# Patient Record
Sex: MALE | Race: BLACK OR AFRICAN AMERICAN | NOT HISPANIC OR LATINO | Employment: OTHER | URBAN - METROPOLITAN AREA
[De-identification: names, ages, dates, MRNs, and addresses within clinical notes are randomized per-mention and may not be internally consistent; named-entity substitution may affect disease eponyms.]

---

## 2018-12-04 ENCOUNTER — TRANSCRIBE ORDERS (OUTPATIENT)
Dept: NEPHROLOGY | Facility: CLINIC | Age: 53
End: 2018-12-04

## 2018-12-04 DIAGNOSIS — N18.30 CKD (CHRONIC KIDNEY DISEASE), SYMPTOM MANAGEMENT ONLY, STAGE 3 (MODERATE) (HCC): Primary | ICD-10-CM

## 2018-12-04 RX ORDER — NIFEDIPINE 90 MG/1
90 TABLET, FILM COATED, EXTENDED RELEASE ORAL DAILY
Refills: 2 | COMMUNITY
Start: 2018-10-31

## 2018-12-04 RX ORDER — COLCHICINE 0.6 MG/1
0.6 TABLET ORAL 2 TIMES DAILY
COMMUNITY
End: 2018-12-14 | Stop reason: ALTCHOICE

## 2018-12-04 RX ORDER — ROSUVASTATIN CALCIUM 5 MG/1
10 TABLET, COATED ORAL DAILY
Refills: 3 | COMMUNITY
Start: 2018-09-21

## 2018-12-04 RX ORDER — METOPROLOL SUCCINATE 100 MG/1
100 TABLET, EXTENDED RELEASE ORAL DAILY
Refills: 3 | COMMUNITY
Start: 2018-09-22

## 2018-12-04 RX ORDER — LOSARTAN POTASSIUM 100 MG/1
100 TABLET ORAL DAILY
Refills: 3 | COMMUNITY
Start: 2018-11-09 | End: 2019-10-17 | Stop reason: ALTCHOICE

## 2018-12-04 RX ORDER — ALLOPURINOL 300 MG/1
TABLET ORAL
Refills: 4 | COMMUNITY
Start: 2018-11-25 | End: 2018-12-14 | Stop reason: DRUGHIGH

## 2018-12-06 LAB
ALBUMIN SNV-MCNC: 3.5 G/DL
BUN SERPL-MCNC: 27 MG/DL (ref 5–25)
CHOLEST SERPL-MCNC: 228 MG/DL (ref 50–200)
CREAT SERPL-MCNC: 1.9 MG/DL (ref 0.6–1.3)
EXT GLUCOSE BLD: 91
EXTERNAL ALK PHOS: 101
EXTERNAL ALT: 24
EXTERNAL AST: 21
EXTERNAL BICARBONATE: 27
EXTERNAL CALCIUM: 9.2
EXTERNAL CHLORIDE: 109
EXTERNAL EGFR: 39
EXTERNAL POTASSIUM: 4.1
EXTERNAL SODIUM: 144
EXTERNAL T.BILIRUBIN: 0.6
EXTERNAL TOTAL PROTEIN: 6.8
EXTERNAL URIC ACID: 5.4
HCT VFR BLD AUTO: 43.4 % (ref 36.5–49.3)
HDLC SERPL-MCNC: 60 MG/DL (ref 40–60)
HGB BLD-MCNC: 14.6 G/DL (ref 12–17)
LDLC SERPL CALC-MCNC: 141 MG/DL (ref 0–100)
PLATELET # BLD AUTO: 293 THOUSANDS/UL (ref 149–390)
TRIGL SERPL-MCNC: 148 MG/DL (ref ?–150)
TSH SERPL DL<=0.005 MIU/L-ACNC: 1.84 M[IU]/L
WBC # BLD AUTO: 17.1 THOUSAND/UL

## 2018-12-14 ENCOUNTER — CONSULT (OUTPATIENT)
Dept: NEPHROLOGY | Facility: CLINIC | Age: 53
End: 2018-12-14
Payer: COMMERCIAL

## 2018-12-14 VITALS — WEIGHT: 315 LBS | BODY MASS INDEX: 39.17 KG/M2 | TEMPERATURE: 97.6 F | HEIGHT: 75 IN

## 2018-12-14 DIAGNOSIS — M10.49 OTHER SECONDARY GOUT, MULTIPLE SITES, UNSPECIFIED CHRONICITY: ICD-10-CM

## 2018-12-14 DIAGNOSIS — I10 ESSENTIAL HYPERTENSION: ICD-10-CM

## 2018-12-14 DIAGNOSIS — N18.30 CKD (CHRONIC KIDNEY DISEASE), SYMPTOM MANAGEMENT ONLY, STAGE 3 (MODERATE) (HCC): Primary | ICD-10-CM

## 2018-12-14 DIAGNOSIS — E78.00 PURE HYPERCHOLESTEROLEMIA: ICD-10-CM

## 2018-12-14 DIAGNOSIS — N18.30 CKD (CHRONIC KIDNEY DISEASE) STAGE 3, GFR 30-59 ML/MIN (HCC): ICD-10-CM

## 2018-12-14 PROCEDURE — 99244 OFF/OP CNSLTJ NEW/EST MOD 40: CPT | Performed by: INTERNAL MEDICINE

## 2018-12-14 RX ORDER — ALLOPURINOL 100 MG/1
100 TABLET ORAL DAILY
Qty: 30 TABLET | Refills: 0 | Status: SHIPPED | OUTPATIENT
Start: 2018-12-14 | End: 2019-03-29 | Stop reason: ALTCHOICE

## 2018-12-14 NOTE — ASSESSMENT & PLAN NOTE
He is anxious to blood pressure initially was high per later on came down to 140/90 which is better  Advised to continue monitoring blood pressure at home  He is going to bring his machine next time    Importance of blood pressure control and kidney disease discussed with him

## 2018-12-14 NOTE — PATIENT INSTRUCTIONS
Chronic Kidney Disease   AMBULATORY CARE:   Chronic kidney disease (CKD)  is the gradual and permanent loss of kidney function  Normally, the kidneys remove fluid, chemicals, and waste from your blood  These wastes are turned into urine by your kidneys  CKD may worsen over time and lead to kidney failure  Common symptoms include the following:   · Changes in how often you need to urinate    · Swelling in your arms, legs, or feet    · Shortness of breath    · Fatigue or weakness    · Bad or bitter taste in your mouth    · Nausea, vomiting, or loss of appetite  Seek care immediately if:   · You are confused and very drowsy  · You have a seizure  · You have shortness of breath  Contact your healthcare provider if:   · You suddenly gain or lose more weight than your healthcare provider has told you is okay  · You have itchy skin or a rash  · You urinate more or less than you normally do  · You have blood in your urine  · You have nausea and repeated vomiting  · You have fatigue or muscle weakness  · You have hiccups that will not stop  · You have questions or concerns about your condition or care  Treatment for CKD:  Medicines may be given to decrease blood pressure and get rid of extra fluid  You may also receive medicine to manage health conditions that may occur with CKD  Dialysis is a treatment to remove chemicals and waste from your blood when your kidneys can no longer do this  Surgery may be needed to create an arteriovenous fistula (AVF) in your arm or insert a catheter into your abdomen so that you can receive dialysis  A kidney transplant may be done if your CKD becomes severe  Manage CKD:   · Maintain a healthy weight  Ask your healthcare provider how much you should weigh  Ask him to help you create a weight loss plan if you are overweight  · Exercise 30 to 60 minutes a day, 4 to 7 times a week, or as directed  Ask about the best exercise plan for you   Regular exercise can help you manage CKD, high blood pressure, and diabetes  · Follow your healthcare provider's advice about what to eat and drink  He may tell you to eat food low in sodium (salt), potassium, phosphorus, or protein  You may need to see a dietitian if you need help planning meals  Ask how much liquid to drink each day and which liquids are best for you  · Limit alcohol  Ask how much alcohol is safe for you to drink  A drink of alcohol is 12 ounces of beer, 5 ounces of wine, or 1½ ounces of liquor  · Do not smoke  Nicotine and other chemicals in cigarettes and cigars can cause lung and kidney damage  Ask your healthcare provider for information if you currently smoke and need help to quit  E-cigarettes or smokeless tobacco still contain nicotine  Talk to your healthcare provider before you use these products  · Ask your healthcare provider if you need vaccines  Infections such as pneumonia, influenza, and hepatitis can be more harmful or more likely to occur in a person who has CKD  Vaccines reduce your risk of infection with these viruses  Follow up with your healthcare provider as directed:  Write down your questions so you remember to ask them during your visits  © 2017 [employer] Information is for End User's use only and may not be sold, redistributed or otherwise used for commercial purposes  All illustrations and images included in CareNotes® are the copyrighted property of [employer]  or [employer]  The above information is an  only  It is not intended as medical advice for individual conditions or treatments  Talk to your doctor, nurse or pharmacist before following any medical regimen to see if it is safe and effective for you

## 2018-12-14 NOTE — ASSESSMENT & PLAN NOTE
He does have stage III CKD  I am not sure about his baseline as that may have gotten worse with pain killer which he took  I will try to get data from the past as he does get blood work every year  I discuss pathophysiology of kidney disease at length with his wife and him  Asymptomatic and progressive nature of kidney disease discussed with him  Control of risk factor also discussed with him  Advised to avoid any nephrotoxic medicine like ibuprofen or any procedure discussed with him  Hydration also advised    I will repeat blood work including urine test and kidney ultrasound    I will also reduce the dose of allopurinol at it can be nephrotoxic and advised to stop it in couple of weeks    Advised to stop taking nonsteroidal pain killer and use Tylenol only

## 2018-12-14 NOTE — PROGRESS NOTES
NEPHROLOGY OFFICE CONSULT  Jania Carballo 45 y o  male MRN: [id number]    Encounter: [id number] 12/14/2018    REASON FOR VISIT: Jania Carballo is a 45 y o male who was referred by Bertha Perez MD for evaluation of Consult    HPI:    Roopa Roger came in today for evaluation and management of CKD  57-year-old gentleman who is obese and problem hypertension for many years was found to have worsening renal function so was advised to see me    Patient claims he had seen me in the past though does not remember when  He does have problem hypertension many years  Take medicine regularly and monitor blood pressure at home which according to him is within reasonable range  He also had attack of gout for which he took some nonsteroidal pain killer inform ibuprofen as well as Indocin  He is feeling better now no attack of gout at this point    He denies any urinary problem except some nocturia  He does drink lots of water  He claims he retired after [employer] is started getting lot of weight which he need to reduce        REVIEW OF SYSTEMS:    Review of Systems   Constitutional: Negative for activity change and fatigue  HENT: Negative for congestion, ear discharge, rhinorrhea, sinus pain, sinus pressure and sore throat  Eyes: Negative for photophobia, pain and visual disturbance  Respiratory: Negative for apnea, cough, choking, chest tightness, shortness of breath, wheezing and stridor  Cardiovascular: Negative for chest pain, palpitations and leg swelling  Gastrointestinal: Negative for abdominal distention, abdominal pain, blood in stool, constipation, diarrhea and nausea  Endocrine: Negative for heat intolerance, polydipsia, polyphagia and polyuria  Genitourinary: Negative for decreased urine volume, difficulty urinating, flank pain and urgency  Musculoskeletal: Positive for arthralgias  Negative for back pain, gait problem, joint swelling, neck pain and neck stiffness     Skin: Negative for color change and wound  Allergic/Immunologic: Negative for food allergies and immunocompromised state  Neurological: Negative for dizziness, seizures, facial asymmetry, weakness and numbness  Hematological: Negative for adenopathy  Does not bruise/bleed easily  Psychiatric/Behavioral: Negative for self-injury and suicidal ideas  PAST MEDICAL HISTORY:  Past Medical History:   Diagnosis Date    Chronic kidney disease     Gout     Hyperlipidemia     Hypertension        PAST SURGICAL HISTORY:  History reviewed  No pertinent surgical history  SOCIAL HISTORY:  History   Alcohol Use    Yes     Comment: occasional      History   Drug Use No     History   Smoking Status    Never Smoker   Smokeless Tobacco    Never Used       FAMILY HISTORY:  Family History   Problem Relation Age of Onset    Diabetes Mother        MEDICATIONS:    Current Outpatient Prescriptions:     losartan (COZAAR) 100 MG tablet, Take 100 mg by mouth daily, Disp: , Rfl: 3    metoprolol succinate (TOPROL-XL) 100 mg 24 hr tablet, Take 100 mg by mouth daily, Disp: , Rfl: 3    NIFEdipine (ADALAT CC) 90 mg 24 hr tablet, Take 90 mg by mouth daily, Disp: , Rfl: 2    rosuvastatin (CRESTOR) 5 mg tablet, Take 5 mg by mouth daily, Disp: , Rfl: 3    allopurinol (ZYLOPRIM) 100 mg tablet, Take 1 tablet (100 mg total) by mouth daily, Disp: 30 tablet, Rfl: 0    PHYSICAL EXAM:  Vitals:    12/14/18 1037   Temp: 97 6 °F (36 4 °C)   TempSrc: Tympanic   Weight: (!) 155 kg (342 lb)   Height: 6' 3" (1 905 m)     Body mass index is 42 75 kg/m²  Physical Exam   Constitutional: He is oriented to person, place, and time  He appears well-developed  No distress  HENT:   Head: Normocephalic and atraumatic  Right Ear: External ear normal    Left Ear: External ear normal    Mouth/Throat: Oropharynx is clear and moist    Eyes: Pupils are equal, round, and reactive to light  Conjunctivae and EOM are normal  No scleral icterus     Neck: Normal range of motion  Neck supple  No JVD present  Cardiovascular: Normal rate, regular rhythm, normal heart sounds and intact distal pulses  No murmur heard  Pulmonary/Chest: Effort normal and breath sounds normal  No respiratory distress  He has no wheezes  He has no rales  Abdominal: Soft  Bowel sounds are normal  He exhibits no distension and no mass  There is no tenderness  Musculoskeletal: Normal range of motion  He exhibits no edema  Neurological: He is alert and oriented to person, place, and time  Skin: Skin is warm  No rash noted  Psychiatric: He has a normal mood and affect  His behavior is normal        LAB RESULTS:  Results for orders placed or performed in visit on 12/06/18   CBC   Result Value Ref Range    WBC 17 10 Thousand/uL    Hemoglobin 14 6 12 0 - 17 0 g/dL    Hematocrit 43 4 36 5 - 49 3 %    Platelets 886 350 - 315 Thousands/uL   Comprehensive metabolic panel   Result Value Ref Range    SODIUM 144     POTASSIUM 4 1     CHLORIDE 109     BICARB 27     BUN 27 (A) 5 - 25 mg/dL    Creatinine 1 90 (A) 0 60 - 1 30 mg/dL    Glucose 91     EXTERNAL CALCIUM 9 2     TOTAL PROTEIN 6 8     Albumin 3 5     AST 21     ALT 24     ALK PHOS 101     EXTERNAL TOT  BILIRUBIN 0 6     EXTERNAL EGFR 39     GFR MDRD Af Amer 45 ml/min/1 73sq m   Lipid panel   Result Value Ref Range    Cholesterol 228 (A) 50 - 200 mg/dL    Triglycerides 148 150 mg/dL    Total HDL  Direct 60 40 - 60 mg/dL    LDL Calculated 141 (A) 0 - 100 mg/dL   Uric acid   Result Value Ref Range    EXTERNAL URIC ACID 5 4    TSH Stimulation   Result Value Ref Range    TSH 1 84        ASSESSMENT and PLAN:    CKD (chronic kidney disease) stage 3, GFR 30-59 ml/min ([facility])  He does have stage III CKD  I am not sure about his baseline as that may have gotten worse with pain killer which he took  I will try to get data from the past as he does get blood work every year  I discuss pathophysiology of kidney disease at length with his wife and him  Asymptomatic and progressive nature of kidney disease discussed with him  Control of risk factor also discussed with him  Advised to avoid any nephrotoxic medicine like ibuprofen or any procedure discussed with him  Hydration also advised    I will repeat blood work including urine test and kidney ultrasound  I will also reduce the dose of allopurinol at it can be nephrotoxic and advised to stop it in couple of weeks    Advised to stop taking nonsteroidal pain killer and use Tylenol only    Other secondary gout, multiple sites  Does not have any acute headache and I will stop allopurinol and monitor him    Hypertension  He is anxious to blood pressure initially was high per later on came down to 140/90 which is better  Advised to continue monitoring blood pressure at home  He is going to bring his machine next time  Importance of blood pressure control and kidney disease discussed with him    I will repeat blood and urine test and kidney ultrasound  Discussed at length about weight reduction with him  Thank you very much for consultation I will monitor the patient with you        Portions of the record may have been created with voice recognition software  Occasional wrong word or "sound a like" substitutions may have occurred due to the inherent limitations of voice recognition software  Read the chart carefully and recognize, using context, where substitutions have occurred  If you have any questions, please contact the dictating provider

## 2018-12-14 NOTE — LETTER
December 14, 2018     Cristopher Ron MD  [address] [facility] [address]    Patient: Daniel Gibbs   YOB: 1965   Date of Visit: 12/14/2018       Dear Dr Lesia Stinson:    Thank you for referring Arian Desai to me for evaluation  Below are my notes for this consultation  If you have questions, please do not hesitate to call me  I look forward to following your patient along with you  Sincerely,        Susan Singer MD        CC: No Recipients  Susan Singer MD  12/14/2018 11:23 AM  Sign at close encounter  [address]  45 y o  male MRN: [id number]    Encounter: [id number] 12/14/2018    REASON FOR VISIT: Daniel Gibbs is a 45 y o male who was referred by Cristopher Ron MD for evaluation of Consult    HPI:    Ed Huitron came in today for evaluation and management of CKD  24-year-old gentleman who is obese and problem hypertension for many years was found to have worsening renal function so was advised to see me    Patient claims he had seen me in the past though does not remember when  He does have problem hypertension many years  Take medicine regularly and monitor blood pressure at home which according to him is within reasonable range  He also had attack of gout for which he took some nonsteroidal pain killer inform ibuprofen as well as Indocin  He is feeling better now no attack of gout at this point    He denies any urinary problem except some nocturia  He does drink lots of water  He claims he retired after [employer] is started getting lot of weight which he need to reduce        REVIEW OF SYSTEMS:    Review of Systems   Constitutional: Negative for activity change and fatigue  HENT: Negative for congestion, ear discharge, rhinorrhea, sinus pain, sinus pressure and sore throat  Eyes: Negative for photophobia, pain and visual disturbance     Respiratory: Negative for apnea, cough, choking, chest tightness, shortness of breath, wheezing and stridor  Cardiovascular: Negative for chest pain, palpitations and leg swelling  Gastrointestinal: Negative for abdominal distention, abdominal pain, blood in stool, constipation, diarrhea and nausea  Endocrine: Negative for heat intolerance, polydipsia, polyphagia and polyuria  Genitourinary: Negative for decreased urine volume, difficulty urinating, flank pain and urgency  Musculoskeletal: Positive for arthralgias  Negative for back pain, gait problem, joint swelling, neck pain and neck stiffness  Skin: Negative for color change and wound  Allergic/Immunologic: Negative for food allergies and immunocompromised state  Neurological: Negative for dizziness, seizures, facial asymmetry, weakness and numbness  Hematological: Negative for adenopathy  Does not bruise/bleed easily  Psychiatric/Behavioral: Negative for self-injury and suicidal ideas  PAST MEDICAL HISTORY:  Past Medical History:   Diagnosis Date    Chronic kidney disease     Gout     Hyperlipidemia     Hypertension        PAST SURGICAL HISTORY:  History reviewed  No pertinent surgical history      SOCIAL HISTORY:  History   Alcohol Use    Yes     Comment: occasional      History   Drug Use No     History   Smoking Status    Never Smoker   Smokeless Tobacco    Never Used       FAMILY HISTORY:  Family History   Problem Relation Age of Onset    Diabetes Mother        MEDICATIONS:    Current Outpatient Prescriptions:     losartan (COZAAR) 100 MG tablet, Take 100 mg by mouth daily, Disp: , Rfl: 3    metoprolol succinate (TOPROL-XL) 100 mg 24 hr tablet, Take 100 mg by mouth daily, Disp: , Rfl: 3    NIFEdipine (ADALAT CC) 90 mg 24 hr tablet, Take 90 mg by mouth daily, Disp: , Rfl: 2    rosuvastatin (CRESTOR) 5 mg tablet, Take 5 mg by mouth daily, Disp: , Rfl: 3    allopurinol (ZYLOPRIM) 100 mg tablet, Take 1 tablet (100 mg total) by mouth daily, Disp: 30 tablet, Rfl: 0    PHYSICAL EXAM:  Vitals: 12/14/18 [facility]   Temp: 97 6 °F (36 4 °C)   TempSrc: Tympanic   Weight: (!) 155 kg (342 lb)   Height: 6' 3" (1 905 m)     Body mass index is 42 75 kg/m²  Physical Exam   Constitutional: He is oriented to person, place, and time  He appears well-developed  No distress  HENT:   Head: Normocephalic and atraumatic  Right Ear: External ear normal    Left Ear: External ear normal    Mouth/Throat: Oropharynx is clear and moist    Eyes: Pupils are equal, round, and reactive to light  Conjunctivae and EOM are normal  No scleral icterus  Neck: Normal range of motion  Neck supple  No JVD present  Cardiovascular: Normal rate, regular rhythm, normal heart sounds and intact distal pulses  No murmur heard  Pulmonary/Chest: Effort normal and breath sounds normal  No respiratory distress  He has no wheezes  He has no rales  Abdominal: Soft  Bowel sounds are normal  He exhibits no distension and no mass  There is no tenderness  Musculoskeletal: Normal range of motion  He exhibits no edema  Neurological: He is alert and oriented to person, place, and time  Skin: Skin is warm  No rash noted  Psychiatric: He has a normal mood and affect  His behavior is normal        LAB RESULTS:  Results for orders placed or performed in visit on 12/06/18   CBC   Result Value Ref Range    WBC 17 10 Thousand/uL    Hemoglobin 14 6 12 0 - 17 0 g/dL    Hematocrit 43 4 36 5 - 49 3 %    Platelets 164 472 - 941 Thousands/uL   Comprehensive metabolic panel   Result Value Ref Range    SODIUM 144     POTASSIUM 4 1     CHLORIDE 109     BICARB 27     BUN 27 (A) 5 - 25 mg/dL    Creatinine 1 90 (A) 0 60 - 1 30 mg/dL    Glucose 91     EXTERNAL CALCIUM 9 2     TOTAL PROTEIN 6 8     Albumin 3 5     AST 21     ALT 24     ALK PHOS 101     EXTERNAL TOT   BILIRUBIN 0 6     EXTERNAL EGFR 39     GFR MDRD Af Amer 45 ml/min/1 73sq m   Lipid panel   Result Value Ref Range    Cholesterol 228 (A) 50 - 200 mg/dL    Triglycerides 148 150 mg/dL    Total HDL Direct 60 40 - 60 mg/dL    LDL Calculated 141 (A) 0 - 100 mg/dL   Uric acid   Result Value Ref Range    EXTERNAL URIC ACID 5 4    TSH Stimulation   Result Value Ref Range    TSH 1 84        ASSESSMENT and PLAN:    CKD (chronic kidney disease) stage 3, GFR 30-59 ml/min ([facility])  He does have stage III CKD  I am not sure about his baseline as that may have gotten worse with pain killer which he took  I will try to get data from the past as he does get blood work every year  I discuss pathophysiology of kidney disease at length with his wife and him  Asymptomatic and progressive nature of kidney disease discussed with him  Control of risk factor also discussed with him  Advised to avoid any nephrotoxic medicine like ibuprofen or any procedure discussed with him  Hydration also advised    I will repeat blood work including urine test and kidney ultrasound  I will also reduce the dose of allopurinol at it can be nephrotoxic and advised to stop it in couple of weeks    Advised to stop taking nonsteroidal pain killer and use Tylenol only    Other secondary gout, multiple sites  Does not have any acute headache and I will stop allopurinol and monitor him    Hypertension  He is anxious to blood pressure initially was high per later on came down to 140/90 which is better  Advised to continue monitoring blood pressure at home  He is going to bring his machine next time  Importance of blood pressure control and kidney disease discussed with him    I will repeat blood and urine test and kidney ultrasound  Discussed at length about weight reduction with him  Thank you very much for consultation I will monitor the patient with you        Portions of the record may have been created with voice recognition software  Occasional wrong word or "sound a like" substitutions may have occurred due to the inherent limitations of voice recognition software   Read the chart carefully and recognize, using context, where substitutions have occurred  If you have any questions, please contact the dictating provider

## 2019-03-27 ENCOUNTER — TELEPHONE (OUTPATIENT)
Dept: NEPHROLOGY | Facility: CLINIC | Age: 54
End: 2019-03-27

## 2019-03-27 LAB
CREAT ?TM UR-SCNC: 359 UMOL/L
EXT PROTEIN URINE: 922.7
PROT/CREAT UR: 2.57 MG/G{CREAT}

## 2019-03-29 ENCOUNTER — OFFICE VISIT (OUTPATIENT)
Dept: NEPHROLOGY | Facility: CLINIC | Age: 54
End: 2019-03-29
Payer: COMMERCIAL

## 2019-03-29 ENCOUNTER — OFFICE VISIT (OUTPATIENT)
Dept: DERMATOLOGY | Facility: CLINIC | Age: 54
End: 2019-03-29
Payer: COMMERCIAL

## 2019-03-29 VITALS
DIASTOLIC BLOOD PRESSURE: 90 MMHG | HEIGHT: 75 IN | RESPIRATION RATE: 16 BRPM | SYSTOLIC BLOOD PRESSURE: 150 MMHG | BODY MASS INDEX: 39.17 KG/M2 | TEMPERATURE: 97.9 F | HEART RATE: 70 BPM | WEIGHT: 315 LBS

## 2019-03-29 DIAGNOSIS — N18.30 CKD (CHRONIC KIDNEY DISEASE) STAGE 3, GFR 30-59 ML/MIN (HCC): ICD-10-CM

## 2019-03-29 DIAGNOSIS — Z13.89 SCREENING FOR SKIN CONDITION: ICD-10-CM

## 2019-03-29 DIAGNOSIS — E66.01 CLASS 3 SEVERE OBESITY DUE TO EXCESS CALORIES WITHOUT SERIOUS COMORBIDITY WITH BODY MASS INDEX (BMI) OF 40.0 TO 44.9 IN ADULT (HCC): ICD-10-CM

## 2019-03-29 DIAGNOSIS — I10 ESSENTIAL HYPERTENSION: Primary | ICD-10-CM

## 2019-03-29 DIAGNOSIS — E78.00 PURE HYPERCHOLESTEROLEMIA: ICD-10-CM

## 2019-03-29 DIAGNOSIS — L40.9 PSORIASIS: ICD-10-CM

## 2019-03-29 DIAGNOSIS — D17.0 LIPOMA OF FACE: Primary | ICD-10-CM

## 2019-03-29 LAB
BACTERIA UR QL AUTO: ABNORMAL /HPF
CHOLEST SERPL-MCNC: 233 MG/DL (ref 50–200)
CREAT ?TM UR-SCNC: 359 UMOL/L
EXT DIFF-ABS BASOPHILS: 0.1
EXT DIFF-ABS EOSINOPHILS: 0.3
EXT DIFF-ABS LYMPHOCYTES: 3.3
EXT DIFF-ABS MONOCYTES: 1
EXT DIFF-ABS NEUTROPHILS: 5.8
EXT GLUCOSE BLD: 104
EXT PROTEIN URINE: 922.7
EXTERNAL ANION GAP: 10
EXTERNAL BUN: 27
EXTERNAL CALCIUM: 9.3
EXTERNAL CHLORIDE: 109
EXTERNAL CO2: 24
EXTERNAL CREATININE: 2.19
EXTERNAL EGFR: 33
EXTERNAL HEMATOCRIT: 45 %
EXTERNAL HEMOGLOBIN: 14.8 G/DL
EXTERNAL MCV: 95
EXTERNAL PHOSPHORUS: 3.3
EXTERNAL PLATELET COUNT: 306 K/ΜL
EXTERNAL POTASSIUM: 4
EXTERNAL PTH: 293.7
EXTERNAL RBC: 4.74
EXTERNAL RDW: 14.5
EXTERNAL SODIUM: 143
EXTERNAL WBC: 10.5
GLUCOSE UR STRIP-MCNC: NEGATIVE MG/DL
HGB UR QL STRIP.AUTO: NEGATIVE
KETONES UR STRIP-MCNC: NEGATIVE MG/DL
LDLC SERPL DIRECT ASSAY-MCNC: 126 MG/DL
LEUKOCYTE ESTERASE UR QL STRIP: NEGATIVE
NITRITE UR QL STRIP: NEGATIVE
PH UR STRIP.AUTO: 5 [PH] (ref 4.5–8)
PROT UR STRIP-MCNC: ABNORMAL MG/DL
PROT/CREAT UR: 2.57 MG/G{CREAT}
RBC #/AREA URNS AUTO: ABNORMAL /HPF
SP GR UR STRIP.AUTO: 1.02 (ref 1–1.03)
TRIGL SERPL-MCNC: 273 MG/DL (ref ?–150)
WBC #/AREA URNS AUTO: ABNORMAL /HPF

## 2019-03-29 PROCEDURE — 99213 OFFICE O/P EST LOW 20 MIN: CPT | Performed by: INTERNAL MEDICINE

## 2019-03-29 PROCEDURE — 99203 OFFICE O/P NEW LOW 30 MIN: CPT | Performed by: DERMATOLOGY

## 2019-03-29 RX ORDER — CALCIPOTRIENE, BETAMETHASONE DIPROPIONATE 50; .643 UG/G; MG/G
OINTMENT TOPICAL DAILY
Qty: 60 G | Refills: 1 | Status: SHIPPED | OUTPATIENT
Start: 2019-03-29

## 2019-03-29 NOTE — PATIENT INSTRUCTIONS
Lipoma probable patient advise no treatment indicated if it does wishes to have this removed would advised him to go see a plastic surgeon as this lesion probably is quite deep  Psoriasis    Patient advised that this is an autoimmune process where the immune system attacks skin and causes the skin to grow more quickly normally replace of skin cells every 5 for 6 weeks we have psoriasis we replace skin cells every 5 or 6 days  Screening for Dermatologic Disorders: Nothing else of concern noted on complete exam follow up in 1 year

## 2019-03-29 NOTE — PROGRESS NOTES
[facility] DERMATOLOGY  [address] [place] [address]  [phone]     MRN: [id number] : 1965  Encounter: [id number]  Patient Information: Abbie Rolon  Chief complaint:rash on elbows    History of present illness:  49-year-old male with history of basically presents for concerns regarding rash on his elbows problem for awhile also concerned regarding a growth on his forehead notably has use mometasone ointment on the area with minimal improvement  Patient concerned regarding appearance  Past Medical History:   Diagnosis Date    Chronic kidney disease     Gout     Hyperlipidemia     Hypertension      History reviewed  No pertinent surgical history  Social History   Social History     Substance and Sexual Activity   Alcohol Use Yes    Comment: occasional      Social History     Substance and Sexual Activity   Drug Use No     Social History     Tobacco Use   Smoking Status Never Smoker   Smokeless Tobacco Never Used     Family History   Problem Relation Age of Onset    Diabetes Mother      Meds/Allergies   No Known Allergies    Meds:  Prior to Admission medications    Medication Sig Start Date End Date Taking?  Authorizing Provider   losartan (COZAAR) 100 MG tablet Take 100 mg by mouth daily 18  Yes Historical Provider, MD   metoprolol succinate (TOPROL-XL) 100 mg 24 hr tablet Take 100 mg by mouth daily 18  Yes Historical Provider, MD   NIFEdipine (ADALAT CC) 90 mg 24 hr tablet Take 90 mg by mouth daily 10/31/18  Yes Historical Provider, MD   rosuvastatin (CRESTOR) 5 mg tablet Take 5 mg by mouth daily 18  Yes Historical Provider, MD   allopurinol (ZYLOPRIM) 100 mg tablet Take 1 tablet (100 mg total) by mouth daily  Patient not taking: Reported on 3/29/2019 12/14/18 3/29/19  Vida Streeter MD       Subjective:     Review of Systems:    General: negative for - chills, fatigue, fever,  weight gain or weight loss  Psychological: negative for - anxiety, behavioral disorder, concentration difficulties, decreased libido, depression, irritability, memory difficulties, mood swings, sleep disturbances or suicidal ideation  ENT: negative for - hearing difficulties , nasal congestion, nasal discharge, oral lesions, sinus pain, sneezing, sore throat  Allergy and Immunology: negative for - hives, insect bite sensitivity,  Hematological and Lymphatic: negative for - bleeding problems, blood clots,bruising, swollen lymph nodes  Endocrine: negative for - hair pattern changes, hot flashes, malaise/lethargy, mood swings, palpitations, polydipsia/polyuria, skin changes, temperature intolerance or unexpected weight change  Respiratory: negative for - cough, hemoptysis, orthopnea, shortness of breath, or wheezing  Cardiovascular: negative for - chest pain, dyspnea on exertion, edema,  Gastrointestinal: negative for - abdominal pain, nausea/vomiting  Genito-Urinary: negative for - dysuria, incontinence, irregular/heavy menses or urinary frequency/urgency  Musculoskeletal: negative for - gait disturbance, joint pain, joint stiffness, joint swelling, muscle pain, muscular weakness  Dermatological:  As in HPI  Neurological: negative for confusion, dizziness, headaches, impaired coordination/balance, memory loss, numbness/tingling, seizures, speech problems, tremors or weakness       Objective: There were no vitals taken for this visit      Physical Exam:    General Appearance:    Alert, cooperative, no distress   Head:    Normocephalic, without obvious abnormality, atraumatic           Skin:   A full skin exam was performed including scalp, head scalp, eyes, ears, nose, lips, neck, chest, axilla, abdomen, back, buttocks, bilateral upper extremities, bilateral lower extremities, hands, feet, fingers, toes, fingernails, and toenails subcutaneous nodule fleshy on the mid forehead scaling erythematous well-demarcated patches noted on the elbows nothing else remarkable noted on exam     Assessment:     1  Lipoma of face     2  Screening for skin condition     3  Psoriasis           Plan:   Lipoma probable patient advise no treatment indicated if it does wishes to have this removed would advised him to go see a plastic surgeon as this lesion probably is quite deep  Psoriasis  Patient advised that this is an autoimmune process where the immune system attacks skin and causes the skin to grow more quickly normally replace of skin cells every 5 for 6 weeks we have psoriasis we replace skin cells every 5 or 6 days  Screening for Dermatologic Disorders: Nothing else of concern noted on complete exam follow up in 1 year       Johan John MD  3/29/2019,11:19 AM    Portions of the record may have been created with voice recognition software   Occasional wrong word or "sound a like" substitutions may have occurred due to the inherent limitations of voice recognition software   Read the chart carefully and recognize, using context, where substitutions have occurred

## 2019-03-29 NOTE — ASSESSMENT & PLAN NOTE
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

## 2019-03-29 NOTE — PROGRESS NOTES
NEPHROLOGY OFFICE FOLLOW UP  Jolanta Rutherford 52 y o  male MRN: [id number]    Encounter: [id number] 3/29/2019    REASON FOR VISIT: Jolanta Rutherford is a 52 y o  male who is here on 3/29/2019 for Chronic Kidney Disease and Hypertension    HPI:    Danial Carroll came in today for follow-up of CKD stage 3  He is doing quite well trying to lose weight he claims he lost more weight though his gaining it back  Since his last visit he lost almost 10 lb  No chest pain no palpitation or shortness of Breath      REVIEW OF SYSTEMS:    Review of Systems   Constitutional: Negative for activity change and fatigue  HENT: Negative for congestion and ear discharge  Eyes: Negative for photophobia and pain  Respiratory: Negative for apnea, choking, shortness of breath and stridor  Cardiovascular: Positive for leg swelling  Negative for chest pain and palpitations  Gastrointestinal: Negative for abdominal distention, abdominal pain, blood in stool and nausea  Endocrine: Negative for heat intolerance, polyphagia and polyuria  Genitourinary: Negative for difficulty urinating, flank pain and urgency  Musculoskeletal: Negative for neck pain and neck stiffness  Skin: Negative for color change and wound  Allergic/Immunologic: Negative for food allergies and immunocompromised state  Neurological: Negative for seizures and facial asymmetry  Hematological: Negative for adenopathy  Does not bruise/bleed easily  Psychiatric/Behavioral: Negative for self-injury and suicidal ideas  PAST MEDICAL HISTORY:  Past Medical History:   Diagnosis Date    Chronic kidney disease     Gout     Hyperlipidemia     Hypertension        PAST SURGICAL HISTORY:  History reviewed  No pertinent surgical history      SOCIAL HISTORY:  Social History     Substance and Sexual Activity   Alcohol Use Yes    Comment: occasional      Social History     Substance and Sexual Activity   Drug Use No     Social History     Tobacco Use Smoking Status Never Smoker   Smokeless Tobacco Never Used       FAMILY HISTORY:  Family History   Problem Relation Age of Onset    Diabetes Mother        MEDICATIONS:    Current Outpatient Medications:     losartan (COZAAR) 100 MG tablet, Take 100 mg by mouth daily, Disp: , Rfl: 3    metoprolol succinate (TOPROL-XL) 100 mg 24 hr tablet, Take 100 mg by mouth daily, Disp: , Rfl: 3    NIFEdipine (ADALAT CC) 90 mg 24 hr tablet, Take 90 mg by mouth daily, Disp: , Rfl: 2    rosuvastatin (CRESTOR) 5 mg tablet, Take 5 mg by mouth daily, Disp: , Rfl: 3    calcipotriene-betamethasone (TACLONEX) ointment, Apply topically daily To elbows to cure, Disp: 60 g, Rfl: 1    PHYSICAL EXAM:  Vitals:    03/29/19 0931   BP: 150/90   BP Location: Right arm   Patient Position: Sitting   Pulse: 70   Resp: 16   Temp: 97 9 °F (36 6 °C)   TempSrc: Oral   Weight: (!) 152 kg (334 lb)   Height: 6' 3" (1 905 m)     Body mass index is 41 75 kg/m²  Physical Exam   Constitutional: He is oriented to person, place, and time  He appears well-developed  No distress  HENT:   Head: Normocephalic  Mouth/Throat: Oropharynx is clear and moist    Eyes: Pupils are equal, round, and reactive to light  Conjunctivae are normal  No scleral icterus  Neck: Normal range of motion  Neck supple  No JVD present  Cardiovascular: Normal rate, regular rhythm and normal heart sounds  Pulmonary/Chest: Effort normal and breath sounds normal  He has no wheezes  Abdominal: Soft  Bowel sounds are normal  There is no tenderness  Musculoskeletal: Normal range of motion  He exhibits no edema  Neurological: He is alert and oriented to person, place, and time  Skin: Skin is warm  No rash noted  Psychiatric: He has a normal mood and affect   His behavior is normal        LAB RESULTS:  Results for orders placed or performed in visit on 12/06/18   CBC   Result Value Ref Range    WBC 17 10 Thousand/uL    Hemoglobin 14 6 12 0 - 17 0 g/dL    Hematocrit 43 4 36 5 - 49 3 %    Platelets 397 095 - 052 Thousands/uL   Comprehensive metabolic panel   Result Value Ref Range    SODIUM 144     POTASSIUM 4 1     CHLORIDE 109     BICARB 27     BUN 27 (A) 5 - 25 mg/dL    Creatinine 1 90 (A) 0 60 - 1 30 mg/dL    Glucose 91     EXTERNAL CALCIUM 9 2     TOTAL PROTEIN 6 8     Albumin 3 5     AST 21     ALT 24     ALK PHOS 101     EXTERNAL TOT  BILIRUBIN 0 6     EXTERNAL EGFR 39     GFR MDRD Af Amer 45 ml/min/1 73sq m   Lipid panel   Result Value Ref Range    Cholesterol 228 (A) 50 - 200 mg/dL    Triglycerides 148 150 mg/dL    Total HDL  Direct 60 40 - 60 mg/dL    LDL Calculated 141 (A) 0 - 100 mg/dL   Uric acid   Result Value Ref Range    EXTERNAL URIC ACID 5 4    TSH Stimulation   Result Value Ref Range    TSH 1 84        ASSESSMENT and PLAN:      CKD (chronic kidney disease) stage 3, GFR 30-59 ml/min ([facility])  He does have stage III CKD with GFR of about 35  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 with hypertension and obesity  I discussed everything at length with him and his wife  Importance of weight reduction discussed with them  Advised hydration and avoidance of any nephrotoxic medicine  It is slowly getting worse which may be natural progression but I will continue to monitor it closely  He is on ARB blocker which I will continue  He does have high PTH which I will continue to monitor as I do not think he has need for any calcitriol  Phosphorus is normal    Hypertension  Borderline high partly because of white coat  Advised to monitor blood pressure at home    Class 3 severe obesity due to excess calories without serious comorbidity with body mass index (BMI) of 40 0 to 44 9 in [facility])  He need to reduce weight which will help in blood pressure as well as kidney disease    Discussion done with him and his wife at length they are well aware of it and trying to lose it        I will see him back in 3 months and will get blood and urine test before that visit      Portions of the record may have been created with voice recognition software  Occasional wrong word or "sound a like" substitutions may have occurred due to the inherent limitations of voice recognition software  Read the chart carefully and recognize, using context, where substitutions have occurred  If you have any questions, please contact the dictating provider

## 2019-03-29 NOTE — PATIENT INSTRUCTIONS
Chronic Kidney Disease   AMBULATORY CARE:   Chronic kidney disease (CKD)  is the gradual and permanent loss of kidney function  Normally, the kidneys remove fluid, chemicals, and waste from your blood  These wastes are turned into urine by your kidneys  CKD may worsen over time and lead to kidney failure  Common symptoms include the following:   · Changes in how often you need to urinate    · Swelling in your arms, legs, or feet    · Shortness of breath    · Fatigue or weakness    · Bad or bitter taste in your mouth    · Nausea, vomiting, or loss of appetite  Seek care immediately if:   · You are confused and very drowsy  · You have a seizure  · You have shortness of breath  Contact your healthcare provider if:   · You suddenly gain or lose more weight than your healthcare provider has told you is okay  · You have itchy skin or a rash  · You urinate more or less than you normally do  · You have blood in your urine  · You have nausea and repeated vomiting  · You have fatigue or muscle weakness  · You have hiccups that will not stop  · You have questions or concerns about your condition or care  Treatment for CKD:  Medicines may be given to decrease blood pressure and get rid of extra fluid  You may also receive medicine to manage health conditions that may occur with CKD  Dialysis is a treatment to remove chemicals and waste from your blood when your kidneys can no longer do this  Surgery may be needed to create an arteriovenous fistula (AVF) in your arm or insert a catheter into your abdomen so that you can receive dialysis  A kidney transplant may be done if your CKD becomes severe  Manage CKD:   · Maintain a healthy weight  Ask your healthcare provider how much you should weigh  Ask him to help you create a weight loss plan if you are overweight  · Exercise 30 to 60 minutes a day, 4 to 7 times a week, or as directed  Ask about the best exercise plan for you   Regular exercise can help you manage CKD, high blood pressure, and diabetes  · Follow your healthcare provider's advice about what to eat and drink  He may tell you to eat food low in sodium (salt), potassium, phosphorus, or protein  You may need to see a dietitian if you need help planning meals  Ask how much liquid to drink each day and which liquids are best for you  · Limit alcohol  Ask how much alcohol is safe for you to drink  A drink of alcohol is 12 ounces of beer, 5 ounces of wine, or 1½ ounces of liquor  · Do not smoke  Nicotine and other chemicals in cigarettes and cigars can cause lung and kidney damage  Ask your healthcare provider for information if you currently smoke and need help to quit  E-cigarettes or smokeless tobacco still contain nicotine  Talk to your healthcare provider before you use these products  · Ask your healthcare provider if you need vaccines  Infections such as pneumonia, influenza, and hepatitis can be more harmful or more likely to occur in a person who has CKD  Vaccines reduce your risk of infection with these viruses  Follow up with your healthcare provider as directed:  Write down your questions so you remember to ask them during your visits  © 2017 [employer] [employer] Information is for End User's use only and may not be sold, redistributed or otherwise used for commercial purposes  All illustrations and images included in CareNotes® are the copyrighted property of [employer]  or [employer]allin [employer]  The above information is an  only  It is not intended as medical advice for individual conditions or treatments  Talk to your doctor, nurse or pharmacist before following any medical regimen to see if it is safe and effective for you

## 2019-03-29 NOTE — ASSESSMENT & PLAN NOTE
He need to reduce weight which will help in blood pressure as well as kidney disease    Discussion done with him and his wife at length they are well aware of it and trying to lose it

## 2019-04-01 LAB — PHOSPHATE SERPL-MCNC: 3.3 MG/DL (ref 2.7–4.5)

## 2019-04-03 LAB
CREAT ?TM UR-SCNC: 359 UMOL/L
EXT DIFF-ABS BASOPHILS: 1
EXT DIFF-ABS EOSINOPHILS: 3
EXT DIFF-ABS LYMPHOCYTES: 3.3
EXT DIFF-ABS MONOCYTES: 1
EXT DIFF-ABS NEUTROPHILS: 5.8
EXT GLUCOSE BLD: 104
EXT PROTEIN URINE: 922.7
EXTERNAL ANION GAP: 10
EXTERNAL BUN: 27
EXTERNAL CALCIUM: 9.3
EXTERNAL CHLORIDE: 109
EXTERNAL CO2: 24
EXTERNAL CREATININE: 2.19
EXTERNAL EGFR: 33
EXTERNAL HEMATOCRIT: 45 %
EXTERNAL HEMOGLOBIN: 14.8 G/DL
EXTERNAL MCV: 95
EXTERNAL PLATELET COUNT: 306 K/ΜL
EXTERNAL POTASSIUM: 4
EXTERNAL PTH: 293.7
EXTERNAL RBC: 4.74
EXTERNAL RDW: 14.5
EXTERNAL SODIUM: 143
EXTERNAL WBC: 10.5
PROT/CREAT UR: 2.57 MG/G{CREAT}

## 2019-04-04 LAB
EXT BLOOD, UA: NORMAL
EXT GLUCOSE, UA: NORMAL
EXT KETONES: NORMAL
EXT NITRITE, UA: NORMAL
EXT PH, UA: 5
EXT PROTEIN, UA: >500
EXT SPECIFIC GRAVITY, UA: 1.02
EXTERNAL BACTERIA (UA): NORMAL
EXTERNAL RBC (UA): 0
EXTERNAL WBC (UA): 0
WBC # BLD EST: NORMAL 10*3/UL

## 2019-07-05 ENCOUNTER — TELEPHONE (OUTPATIENT)
Dept: NEPHROLOGY | Facility: CLINIC | Age: 54
End: 2019-07-05

## 2019-07-05 NOTE — TELEPHONE ENCOUNTER
Called and left a message on machine for patient confirming his appointment for Monday 7/8/2019 3 month follow up with Dr Deon Lee, I also reminded the patient to have his blood work done for this appointment   Delmis Kruger,

## 2019-07-08 ENCOUNTER — TELEPHONE (OUTPATIENT)
Dept: NEPHROLOGY | Facility: CLINIC | Age: 54
End: 2019-07-08

## 2019-07-08 DIAGNOSIS — N18.30 CKD (CHRONIC KIDNEY DISEASE) STAGE 3, GFR 30-59 ML/MIN (HCC): Primary | ICD-10-CM

## 2019-07-08 NOTE — TELEPHONE ENCOUNTER
I called and spoke with Ekta Huston to reschedule his appointment he missed today 7/8/19 with Dr Abdelrahman Pardo reschedule for 9/9/19 @3:15pm and he needs new order for blood work put in [address] [place] [address] Rd once this is done he wants a phone to let him know that the orders are in [address] [place] [address] Rd so he can come and  a copy for himself   Ema Blanca phone number is [phone]  [address] [place] [address]

## 2019-07-09 NOTE — TELEPHONE ENCOUNTER
I did  Melinda Milligan and he said I can mail the orders for blood work to his home    [address] [place] [address]

## 2019-09-06 ENCOUNTER — TELEPHONE (OUTPATIENT)
Dept: NEPHROLOGY | Facility: CLINIC | Age: 54
End: 2019-09-06

## 2019-09-06 LAB
CREAT ?TM UR-SCNC: 185 UMOL/L
EXT PROTEIN URINE: 348
PROT/CREAT UR: 1.88 MG/G{CREAT}

## 2019-09-09 ENCOUNTER — OFFICE VISIT (OUTPATIENT)
Dept: NEPHROLOGY | Facility: CLINIC | Age: 54
End: 2019-09-09
Payer: COMMERCIAL

## 2019-09-09 VITALS
DIASTOLIC BLOOD PRESSURE: 90 MMHG | TEMPERATURE: 98.1 F | HEIGHT: 75 IN | HEART RATE: 78 BPM | RESPIRATION RATE: 18 BRPM | SYSTOLIC BLOOD PRESSURE: 140 MMHG | BODY MASS INDEX: 39.17 KG/M2 | WEIGHT: 315 LBS

## 2019-09-09 DIAGNOSIS — E66.01 CLASS 3 SEVERE OBESITY DUE TO EXCESS CALORIES WITHOUT SERIOUS COMORBIDITY WITH BODY MASS INDEX (BMI) OF 40.0 TO 44.9 IN ADULT (HCC): ICD-10-CM

## 2019-09-09 DIAGNOSIS — M10.49 OTHER SECONDARY GOUT, MULTIPLE SITES, UNSPECIFIED CHRONICITY: ICD-10-CM

## 2019-09-09 DIAGNOSIS — E83.9 CHRONIC KIDNEY DISEASE-MINERAL AND BONE DISORDER: ICD-10-CM

## 2019-09-09 DIAGNOSIS — N18.9 CHRONIC KIDNEY DISEASE-MINERAL AND BONE DISORDER: ICD-10-CM

## 2019-09-09 DIAGNOSIS — M89.9 CHRONIC KIDNEY DISEASE-MINERAL AND BONE DISORDER: ICD-10-CM

## 2019-09-09 DIAGNOSIS — N18.30 CKD (CHRONIC KIDNEY DISEASE) STAGE 3, GFR 30-59 ML/MIN (HCC): Primary | ICD-10-CM

## 2019-09-09 DIAGNOSIS — I10 ESSENTIAL HYPERTENSION: ICD-10-CM

## 2019-09-09 LAB
25(OH)D3 SERPL-MCNC: 18 NG/ML (ref 30–100)
CREAT ?TM UR-SCNC: 184 UMOL/L
EXT BLOOD, UA: NORMAL
EXT DIFF-ABS BASOPHILS: 0.1
EXT DIFF-ABS EOSINOPHILS: 0
EXT DIFF-ABS LYMPHOCYTES: 2.3
EXT DIFF-ABS MONOCYTES: 1.2
EXT DIFF-ABS NEUTROPHILS: 11.8
EXT GLUCOSE BLD: 96
EXT GLUCOSE, UA: NEGATIVE
EXT KETONES: NEGATIVE
EXT NITRITE, UA: NEGATIVE
EXT PH, UA: 5
EXT PROTEIN URINE: 348
EXT PROTEIN, UA: >500
EXT SPECIFIC GRAVITY, UA: 1.01
EXTERNAL ANION GAP: 11
EXTERNAL BACTERIA (UA): NORMAL
EXTERNAL BUN: 36
EXTERNAL CALCIUM: 9.2
EXTERNAL CASTS (UA): NORMAL
EXTERNAL CHLORIDE: 109
EXTERNAL CO2: 22
EXTERNAL CREATININE: 2.8
EXTERNAL EGFR: 24
EXTERNAL HEMATOCRIT: 43 %
EXTERNAL HEMOGLOBIN: 14.1 G/DL
EXTERNAL MCV: 94
EXTERNAL PHOSPHORUS: 3.7
EXTERNAL PLATELET COUNT: 330 K/ΜL
EXTERNAL POTASSIUM: 4.1
EXTERNAL PTH: 293.3
EXTERNAL RBC (UA): NORMAL
EXTERNAL RBC: 4.57
EXTERNAL RDW: 14.5
EXTERNAL SODIUM: 142
EXTERNAL WBC (UA): NORMAL
EXTERNAL WBC: 15.5
PROT/CREAT UR: 1.88 MG/G{CREAT}
WBC # BLD EST: NEGATIVE 10*3/UL

## 2019-09-09 PROCEDURE — 99214 OFFICE O/P EST MOD 30 MIN: CPT | Performed by: INTERNAL MEDICINE

## 2019-09-09 RX ORDER — HYDRALAZINE HYDROCHLORIDE 25 MG/1
25 TABLET, FILM COATED ORAL 3 TIMES DAILY
COMMUNITY
End: 2019-10-17 | Stop reason: ALTCHOICE

## 2019-09-09 RX ORDER — ALLOPURINOL 100 MG/1
100 TABLET ORAL DAILY
Qty: 30 TABLET | Refills: 4 | Status: SHIPPED | OUTPATIENT
Start: 2019-09-09 | End: 2019-10-17 | Stop reason: ALTCHOICE

## 2019-09-09 NOTE — PROGRESS NOTES
NEPHROLOGY OFFICE FOLLOW UP  Denisse Sung 52 y o  male MRN: [id number]    Encounter: [id number] 9/9/2019    REASON FOR VISIT: Denisse Sung is a 52 y o  male who is here on 9/9/2019 for Follow-up    HPI:    Juli Ramos came in today for follow-up of CKD  He came after a while  In between he was suffering from the gout attack requiring prednisone off and on  He is off allopurinol because of nephrotoxicity  Because of the recurrent of gout he could not do much of the exercise and gained some weight  He still on prednisone for the gout    Denies taking any nonsteroidal pain killer  Took some Tylenol extraction for the joint pain he is also drinking lots of water according to him    No chest pain no palpitation no nausea no vomiting      REVIEW OF SYSTEMS:    Review of Systems   Constitutional: Negative for activity change and fatigue  HENT: Negative for congestion and ear discharge  Eyes: Negative for photophobia and pain  Respiratory: Negative for apnea, choking, chest tightness and shortness of breath  Cardiovascular: Negative for chest pain and palpitations  Gastrointestinal: Negative for abdominal distention and blood in stool  Endocrine: Negative for heat intolerance and polyphagia  Genitourinary: Negative for flank pain and urgency  Musculoskeletal: Positive for arthralgias  Negative for neck pain and neck stiffness  Skin: Negative for color change and wound  Allergic/Immunologic: Negative for food allergies and immunocompromised state  Neurological: Negative for seizures and facial asymmetry  Hematological: Negative for adenopathy  Does not bruise/bleed easily  Psychiatric/Behavioral: Negative for self-injury and suicidal ideas  PAST MEDICAL HISTORY:  Past Medical History:   Diagnosis Date    Chronic kidney disease     Gout     Hyperlipidemia     Hypertension        PAST SURGICAL HISTORY:  History reviewed  No pertinent surgical history      SOCIAL HISTORY:  Social History     Substance and Sexual Activity   Alcohol Use Yes    Comment: occasional      Social History     Substance and Sexual Activity   Drug Use No     Social History     Tobacco Use   Smoking Status Never Smoker   Smokeless Tobacco Never Used       FAMILY HISTORY:  Family History   Problem Relation Age of Onset    Diabetes Mother        MEDICATIONS:    Current Outpatient Medications:     calcipotriene-betamethasone (TACLONEX) ointment, Apply topically daily To elbows to cure, Disp: 60 g, Rfl: 1    hydrALAZINE (APRESOLINE) 25 mg tablet, Take 25 mg by mouth 3 (three) times a day, Disp: , Rfl:     metoprolol succinate (TOPROL-XL) 100 mg 24 hr tablet, Take 100 mg by mouth daily, Disp: , Rfl: 3    NIFEdipine (ADALAT CC) 90 mg 24 hr tablet, Take 90 mg by mouth daily, Disp: , Rfl: 2    rosuvastatin (CRESTOR) 5 mg tablet, Take 10 mg by mouth daily , Disp: , Rfl: 3    allopurinol (ZYLOPRIM) 100 mg tablet, Take 1 tablet (100 mg total) by mouth daily, Disp: 30 tablet, Rfl: 4    losartan (COZAAR) 100 MG tablet, Take 100 mg by mouth daily, Disp: , Rfl: 3    PHYSICAL EXAM:  Vitals:    09/09/19 1517   BP: 140/90   BP Location: Right arm   Patient Position: Sitting   Pulse: 78   Resp: 18   Temp: 98 1 °F (36 7 °C)   TempSrc: Temporal   Weight: (!) 151 kg (333 lb)   Height: 6' 2 5" (1 892 m)     Body mass index is 42 18 kg/m²  Physical Exam   Constitutional: He is oriented to person, place, and time  He appears well-developed  No distress  HENT:   Head: Normocephalic  Mouth/Throat: Oropharynx is clear and moist    Eyes: Pupils are equal, round, and reactive to light  Conjunctivae are normal  No scleral icterus  Neck: Normal range of motion  Neck supple  No JVD present  Cardiovascular: Normal rate and normal heart sounds  Pulmonary/Chest: Effort normal and breath sounds normal  He has no wheezes  Abdominal: Soft  Bowel sounds are normal  There is no tenderness     Musculoskeletal: Normal range of motion  He exhibits no edema  Neurological: He is alert and oriented to person, place, and time  Skin: Skin is warm  No rash noted  Psychiatric: He has a normal mood and affect  His behavior is normal        LAB RESULTS:  Results for orders placed or performed in visit on 04/04/19   Urinalysis with microscopic   Result Value Ref Range    EXTERNAL COLOR,UA      Spec Grav, UA (Ref:1 003-1 030) 1 023     Glucose, UA (Ref: Negative) neg     Ketones, UA (Ref: Negative) neg     Blood, UA neg     Nitrite, UA (Ref: Negative) neg      Leukocytes, UA (Ref: Negative) neg     pH, UA (Ref: 4 5-8 0) 5 0     Protein, UA (Ref: Negative) >500     Bilirubin, UA (Ref: Negative)      Urobilinogen, UA (Ref: 0 2- 1 0)      RBC, UA 0     EXTERNAL WBC, UA 0     EXTERNAL BACTERIA, UA few     CASTS, UA         ASSESSMENT and PLAN:      CKD (chronic kidney disease) stage 3, GFR 30-59 ml/min ([facility])  His kidney function is slowly deteriorating GFR is almost 28  He is quite upset about it  I had a long talk with him about that  Advised him not to drink any other liquid but water  I will taking of the losartan also  Will keep him off allopurinol at this point  Will monitor him closely    Chronic kidney disease-mineral and bone disorder  Will check PTH and phosphorus next visit  PTH is 293 with phosphorus normal at this time  Hypertension  Blood pressure is stable with diastolic being 90  It may go up since I am stopping losartan  I prescribed hydralazine which is going to take it if he need to  He will call me with blood pressure reading this week    Other secondary gout, multiple sites  Patient is getting recurrent headache of gout  I advised him to discussed with primary care doctor about a referral to rheumatologist for further management      I will see him back in 6 weeks  Will get blood and urine test before that visit  He will monitor blood pressure and call me if it is going up    Everything was discussed at length with him including lab test in medication        Portions of the record may have been created with voice recognition software  Occasional wrong word or "sound a like" substitutions may have occurred due to the inherent limitations of voice recognition software  Read the chart carefully and recognize, using context, where substitutions have occurred  If you have any questions, please contact the dictating provider

## 2019-09-09 NOTE — PATIENT INSTRUCTIONS
Chronic Kidney Disease   AMBULATORY CARE:   Chronic kidney disease (CKD)  is the gradual and permanent loss of kidney function  Normally, the kidneys remove fluid, chemicals, and waste from your blood  These wastes are turned into urine by your kidneys  CKD may worsen over time and lead to kidney failure  Common symptoms include the following:   · Changes in how often you need to urinate    · Swelling in your arms, legs, or feet    · Shortness of breath    · Fatigue or weakness    · Bad or bitter taste in your mouth    · Nausea, vomiting, or loss of appetite  Seek care immediately if:   · You are confused and very drowsy  · You have a seizure  · You have shortness of breath  Contact your healthcare provider if:   · You suddenly gain or lose more weight than your healthcare provider has told you is okay  · You have itchy skin or a rash  · You urinate more or less than you normally do  · You have blood in your urine  · You have nausea and repeated vomiting  · You have fatigue or muscle weakness  · You have hiccups that will not stop  · You have questions or concerns about your condition or care  Treatment for CKD:  Medicines may be given to decrease blood pressure and get rid of extra fluid  You may also receive medicine to manage health conditions that may occur with CKD  Dialysis is a treatment to remove chemicals and waste from your blood when your kidneys can no longer do this  Surgery may be needed to create an arteriovenous fistula (AVF) in your arm or insert a catheter into your abdomen so that you can receive dialysis  A kidney transplant may be done if your CKD becomes severe  Manage CKD:   · Maintain a healthy weight  Ask your healthcare provider how much you should weigh  Ask him to help you create a weight loss plan if you are overweight  · Exercise 30 to 60 minutes a day, 4 to 7 times a week, or as directed  Ask about the best exercise plan for you   Regular exercise can help you manage CKD, high blood pressure, and diabetes  · Follow your healthcare provider's advice about what to eat and drink  He may tell you to eat food low in sodium (salt), potassium, phosphorus, or protein  You may need to see a dietitian if you need help planning meals  Ask how much liquid to drink each day and which liquids are best for you  · Limit alcohol  Ask how much alcohol is safe for you to drink  A drink of alcohol is 12 ounces of beer, 5 ounces of wine, or 1½ ounces of liquor  · Do not smoke  Nicotine and other chemicals in cigarettes and cigars can cause lung and kidney damage  Ask your healthcare provider for information if you currently smoke and need help to quit  E-cigarettes or smokeless tobacco still contain nicotine  Talk to your healthcare provider before you use these products  · Ask your healthcare provider if you need vaccines  Infections such as pneumonia, influenza, and hepatitis can be more harmful or more likely to occur in a person who has CKD  Vaccines reduce your risk of infection with these viruses  Follow up with your healthcare provider as directed:  Write down your questions so you remember to ask them during your visits  © 2017 [employer] [employer] Information is for End User's use only and may not be sold, redistributed or otherwise used for commercial purposes  All illustrations and images included in CareNotes® are the copyrighted property of [employer]  or [employer]  The above information is an  only  It is not intended as medical advice for individual conditions or treatments  Talk to your doctor, nurse or pharmacist before following any medical regimen to see if it is safe and effective for you

## 2019-09-09 NOTE — ASSESSMENT & PLAN NOTE
His kidney function is slowly deteriorating GFR is almost 28  He is quite upset about it  I had a long talk with him about that  Advised him not to drink any other liquid but water  I will taking of the losartan also  Will keep him off allopurinol at this point    Will monitor him closely

## 2019-09-09 NOTE — ASSESSMENT & PLAN NOTE
Blood pressure is stable with diastolic being 90  It may go up since I am stopping losartan  I prescribed hydralazine which is going to take it if he need to    He will call me with blood pressure reading this week

## 2019-09-09 NOTE — ASSESSMENT & PLAN NOTE
Patient is getting recurrent headache of gout    I advised him to discussed with primary care doctor about a referral to rheumatologist for further management

## 2019-09-17 LAB
25(OH)D3 SERPL-MCNC: 18 NG/ML (ref 30–100)
CREAT ?TM UR-SCNC: 185 UMOL/L
EXT BLOOD, UA: NORMAL
EXT DIFF-ABS BASOPHILS: 0.1
EXT DIFF-ABS EOSINOPHILS: 0
EXT DIFF-ABS LYMPHOCYTES: 2.3
EXT DIFF-ABS MONOCYTES: 1.2
EXT DIFF-ABS NEUTROPHILS: 11.8
EXT GLUCOSE BLD: 96
EXT GLUCOSE, UA: NEGATIVE
EXT KETONES: NEGATIVE
EXT NITRITE, UA: NEGATIVE
EXT PH, UA: 5
EXT PROTEIN URINE: 348
EXT PROTEIN, UA: >500
EXT SPECIFIC GRAVITY, UA: 1.01
EXTERNAL ANION GAP: 11
EXTERNAL BACTERIA (UA): NORMAL
EXTERNAL BUN: 36
EXTERNAL CALCIUM: 9.2
EXTERNAL CASTS (UA): NORMAL
EXTERNAL CHLORIDE: 109
EXTERNAL CO2: 22
EXTERNAL CREATININE: 2.8
EXTERNAL EGFR: 24
EXTERNAL HEMATOCRIT: 43 %
EXTERNAL HEMOGLOBIN: 14.4 G/DL
EXTERNAL MCV: 94
EXTERNAL PHOSPHORUS: 3.7
EXTERNAL PLATELET COUNT: 330 K/ΜL
EXTERNAL POTASSIUM: 4.1
EXTERNAL PTH: 293.3
EXTERNAL RBC (UA): NORMAL
EXTERNAL RBC: 4.57
EXTERNAL RDW: 14.5
EXTERNAL SODIUM: 142
EXTERNAL WBC (UA): NORMAL
EXTERNAL WBC: 15.5
PROT/CREAT UR: 1.88 MG/G{CREAT}
WBC # BLD EST: NEGATIVE 10*3/UL

## 2019-10-15 LAB
CREAT ?TM UR-SCNC: 578 UMOL/L
EXT PROTEIN URINE: 583.7
PROT/CREAT UR: 1.01 MG/G{CREAT}

## 2019-10-17 ENCOUNTER — OFFICE VISIT (OUTPATIENT)
Dept: NEPHROLOGY | Facility: CLINIC | Age: 54
End: 2019-10-17
Payer: COMMERCIAL

## 2019-10-17 VITALS
HEIGHT: 74 IN | RESPIRATION RATE: 18 BRPM | BODY MASS INDEX: 40.43 KG/M2 | TEMPERATURE: 97.9 F | DIASTOLIC BLOOD PRESSURE: 100 MMHG | HEART RATE: 68 BPM | SYSTOLIC BLOOD PRESSURE: 150 MMHG | WEIGHT: 315 LBS

## 2019-10-17 DIAGNOSIS — M89.9 CHRONIC KIDNEY DISEASE-MINERAL AND BONE DISORDER: ICD-10-CM

## 2019-10-17 DIAGNOSIS — E66.01 CLASS 3 SEVERE OBESITY DUE TO EXCESS CALORIES WITHOUT SERIOUS COMORBIDITY WITH BODY MASS INDEX (BMI) OF 40.0 TO 44.9 IN ADULT (HCC): ICD-10-CM

## 2019-10-17 DIAGNOSIS — E83.9 CHRONIC KIDNEY DISEASE-MINERAL AND BONE DISORDER: ICD-10-CM

## 2019-10-17 DIAGNOSIS — N18.9 CHRONIC KIDNEY DISEASE-MINERAL AND BONE DISORDER: ICD-10-CM

## 2019-10-17 DIAGNOSIS — I10 ESSENTIAL HYPERTENSION: ICD-10-CM

## 2019-10-17 DIAGNOSIS — N18.30 CKD (CHRONIC KIDNEY DISEASE) STAGE 3, GFR 30-59 ML/MIN (HCC): Primary | ICD-10-CM

## 2019-10-17 PROCEDURE — 99214 OFFICE O/P EST MOD 30 MIN: CPT | Performed by: INTERNAL MEDICINE

## 2019-10-17 RX ORDER — PREDNISONE 20 MG/1
TABLET ORAL
Refills: 1 | COMMUNITY
Start: 2019-10-07

## 2019-10-17 RX ORDER — CHOLECALCIFEROL (VITAMIN D3) 1250 MCG
1 CAPSULE ORAL WEEKLY
Refills: 0 | COMMUNITY
Start: 2019-09-10

## 2019-10-17 RX ORDER — COLCHICINE 0.6 MG/1
0.6 TABLET ORAL DAILY
Refills: 1 | COMMUNITY
Start: 2019-10-08

## 2019-10-17 RX ORDER — OMEPRAZOLE 40 MG/1
40 CAPSULE, DELAYED RELEASE ORAL DAILY
Refills: 0 | COMMUNITY
Start: 2019-09-30

## 2019-10-17 NOTE — PROGRESS NOTES
NEPHROLOGY OFFICE FOLLOW UP  Omar Osorio 52 y o  male MRN: [id number]    Encounter: [id number] 10/17/2019    REASON FOR VISIT: Omar Osorio is a 52 y o  male who is here on 10/17/2019 for Follow-up and Chronic Kidney Disease    HPI:    Rossana Ascencio came in today for follow-up of stage IV CKD  He came with his wife this time  He is doing well  Does not have any complaint    No chest pain no palpitation or shortness of breath      REVIEW OF SYSTEMS:    Review of Systems   Constitutional: Negative for activity change and fatigue  HENT: Negative for congestion and ear discharge  Eyes: Negative for photophobia and pain  Respiratory: Negative for apnea and choking  Cardiovascular: Negative for chest pain and palpitations  Gastrointestinal: Negative for abdominal distention and blood in stool  Endocrine: Negative for heat intolerance and polyphagia  Genitourinary: Negative for flank pain and urgency  Musculoskeletal: Negative for neck pain and neck stiffness  Skin: Negative for color change and wound  Allergic/Immunologic: Negative for food allergies and immunocompromised state  Neurological: Negative for seizures and facial asymmetry  Hematological: Negative for adenopathy  Does not bruise/bleed easily  Psychiatric/Behavioral: Negative for self-injury and suicidal ideas  PAST MEDICAL HISTORY:  Past Medical History:   Diagnosis Date    Chronic kidney disease     Gout     Hyperlipidemia     Hypertension        PAST SURGICAL HISTORY:  History reviewed  No pertinent surgical history      SOCIAL HISTORY:  Social History     Substance and Sexual Activity   Alcohol Use Yes    Comment: occasional      Social History     Substance and Sexual Activity   Drug Use No     Social History     Tobacco Use   Smoking Status Never Smoker   Smokeless Tobacco Never Used       FAMILY HISTORY:  Family History   Problem Relation Age of Onset    Diabetes Mother        MEDICATIONS:    Current Outpatient Medications:     calcipotriene-betamethasone (TACLONEX) ointment, Apply topically daily To elbows to cure, Disp: 60 g, Rfl: 1    Cholecalciferol (VITAMIN D3) 02103 units CAPS, Take 1 capsule by mouth once a week, Disp: , Rfl: 0    colchicine (COLCRYS) 0 6 mg tablet, Take 0 6 mg by mouth daily , Disp: , Rfl: 1    metoprolol succinate (TOPROL-XL) 100 mg 24 hr tablet, Take 100 mg by mouth daily, Disp: , Rfl: 3    NIFEdipine (ADALAT CC) 90 mg 24 hr tablet, Take 90 mg by mouth daily, Disp: , Rfl: 2    omeprazole (PriLOSEC) 40 MG capsule, Take 40 mg by mouth daily, Disp: , Rfl: 0    predniSONE 20 mg tablet, TAKE 2 TABLET(S) EVERY DAY BY ORAL ROUTE FOR 4 DAYS , Disp: , Rfl: 1    rosuvastatin (CRESTOR) 5 mg tablet, Take 10 mg by mouth daily , Disp: , Rfl: 3    PHYSICAL EXAM:  Vitals:    10/17/19 1102   BP: 150/100   BP Location: Right arm   Patient Position: Sitting   Pulse: 68   Resp: 18   Temp: 97 9 °F (36 6 °C)   TempSrc: Tympanic   Weight: (!) 147 kg (325 lb)   Height: 6' 1 5" (1 867 m)     Body mass index is 42 3 kg/m²  Physical Exam   Constitutional: He is oriented to person, place, and time  He appears well-developed  No distress  HENT:   Head: Normocephalic  Mouth/Throat: Oropharynx is clear and moist    Eyes: Conjunctivae are normal  No scleral icterus  Neck: Normal range of motion  Neck supple  No JVD present  Cardiovascular: Normal rate and normal heart sounds  Pulmonary/Chest: Effort normal and breath sounds normal  He has no wheezes  Abdominal: Soft  Bowel sounds are normal  There is no tenderness  Musculoskeletal: Normal range of motion  He exhibits no edema  Neurological: He is alert and oriented to person, place, and time  Skin: Skin is warm  No rash noted  Psychiatric: He has a normal mood and affect   His behavior is normal        LAB RESULTS:  Results for orders placed or performed in visit on 20/13/67   Basic metabolic panel   Result Value Ref Range    SODIUM 142 POTASSIUM 4 1     CHLORIDE 109     CO2 22     BUN 36     CREATININE 2 80     Glucose 96     EXTERNAL CALCIUM 9 2     ANION GAP 11     EXTERNAL EGFR 24    Protein / creatinine ratio, urine   Result Value Ref Range    PROTEIN  0     EXT Creatinine Urine 185 0     EXTERNAL Ur Prot/Creat Ratio 1 88    Phosphorus   Result Value Ref Range    EXTERNAL PHOSPHORUS 3 7    Urinalysis with microscopic   Result Value Ref Range    Spec Grav, UA (Ref:1 003-1 030) 1 015     Glucose, UA (Ref: Negative) negative     Ketones, UA (Ref: Negative) negative     Blood, UA 0 03-0 19     Nitrite, UA (Ref: Negative) negative      Leukocytes, UA (Ref: Negative) negative     pH, UA (Ref: 4 5-8 0) 5 0     Protein, UA (Ref: Negative) >500     RBC, UA 0-2     EXTERNAL WBC, UA 0-2     EXTERNAL BACTERIA, UA few     CASTS, UA 6-10    VITAMIN D, 25 HYDROXY, TOTAL   Result Value Ref Range    Vit D, 25-Hydroxy 18 0 (A) 30 0 - 100 0 ng/mL   CBC and differential   Result Value Ref Range    WBC 15 5     External RBC 4 57     External Hemoglobin 14 4 g/dL    Hematocrit 43 %    MCV 94     External RDW 14 5     External Platelets 300 K/µL    Abs Neutrophils 11 8     Abs Lymphocytes 2 3     External Abs Monocytes  1 2     External Abs Eosinophils 0 0     External Abs Basophils  0 1    PTH, intact   Result Value Ref Range     3        ASSESSMENT and PLAN:      CKD (chronic kidney disease) stage 3, GFR 30-59 ml/min ([facility])  His GFR is actually 25 making it stage IV  Likely etiology hypertensive nephrosclerosis  He is on medication for blood pressure which will continue  Everything discussed at length with him and his wife  Pathophysiology of kidney disease discussed with him  Diet also discussed with him advised hydration and avoidance of any nephrotoxic medicine  His urinary study suggest possibly volume depletion at this point    Chronic kidney disease-mineral and bone disorder  His PTH is 293 with low vitamin-D level    I will supplement vitamin-D with shayne dose and will monitor him    Class 3 severe obesity due to excess calories without serious comorbidity with body mass index (BMI) of 40 0 to 44 9 in [facility])  He need to reduce weight for renal protection and he is well aware of it and working on it    Hypertension  Not very well control  He claims it is because of stress  He monitor blood pressure at home and according to him it is much better controlled  Advised  continue to monitor blood pressure and bring the machine next time in the office  I will restart losartan once kidney function gets stabilized    Everything discussed at length with him and his wife  I will see him back in 2 months  Will get blood and urine test before that visit          Portions of the record may have been created with voice recognition software  Occasional wrong word or "sound a like" substitutions may have occurred due to the inherent limitations of voice recognition software  Read the chart carefully and recognize, using context, where substitutions have occurred  If you have any questions, please contact the dictating provider

## 2019-10-17 NOTE — ASSESSMENT & PLAN NOTE
His PTH is 293 with low vitamin-D level    I will supplement vitamin-D with shayne dose and will monitor him

## 2019-10-17 NOTE — ASSESSMENT & PLAN NOTE
His GFR is actually 25 making it stage IV  Likely etiology hypertensive nephrosclerosis  He is on medication for blood pressure which will continue  Everything discussed at length with him and his wife  Pathophysiology of kidney disease discussed with him  Diet also discussed with him advised hydration and avoidance of any nephrotoxic medicine    His urinary study suggest possibly volume depletion at this point

## 2019-10-17 NOTE — PATIENT INSTRUCTIONS
Chronic Kidney Disease   AMBULATORY CARE:   Chronic kidney disease (CKD)  is the gradual and permanent loss of kidney function  Normally, the kidneys remove fluid, chemicals, and waste from your blood  These wastes are turned into urine by your kidneys  CKD may worsen over time and lead to kidney failure  Common symptoms include the following:   · Changes in how often you need to urinate    · Swelling in your arms, legs, or feet    · Shortness of breath    · Fatigue or weakness    · Bad or bitter taste in your mouth    · Nausea, vomiting, or loss of appetite  Seek care immediately if:   · You are confused and very drowsy  · You have a seizure  · You have shortness of breath  Contact your healthcare provider if:   · You suddenly gain or lose more weight than your healthcare provider has told you is okay  · You have itchy skin or a rash  · You urinate more or less than you normally do  · You have blood in your urine  · You have nausea and repeated vomiting  · You have fatigue or muscle weakness  · You have hiccups that will not stop  · You have questions or concerns about your condition or care  Treatment for CKD:  Medicines may be given to decrease blood pressure and get rid of extra fluid  You may also receive medicine to manage health conditions that may occur with CKD  Dialysis is a treatment to remove chemicals and waste from your blood when your kidneys can no longer do this  Surgery may be needed to create an arteriovenous fistula (AVF) in your arm or insert a catheter into your abdomen so that you can receive dialysis  A kidney transplant may be done if your CKD becomes severe  Manage CKD:   · Maintain a healthy weight  Ask your healthcare provider how much you should weigh  Ask him to help you create a weight loss plan if you are overweight  · Exercise 30 to 60 minutes a day, 4 to 7 times a week, or as directed  Ask about the best exercise plan for you   Regular exercise can help you manage CKD, high blood pressure, and diabetes  · Follow your healthcare provider's advice about what to eat and drink  He may tell you to eat food low in sodium (salt), potassium, phosphorus, or protein  You may need to see a dietitian if you need help planning meals  Ask how much liquid to drink each day and which liquids are best for you  · Limit alcohol  Ask how much alcohol is safe for you to drink  A drink of alcohol is 12 ounces of beer, 5 ounces of wine, or 1½ ounces of liquor  · Do not smoke  Nicotine and other chemicals in cigarettes and cigars can cause lung and kidney damage  Ask your healthcare provider for information if you currently smoke and need help to quit  E-cigarettes or smokeless tobacco still contain nicotine  Talk to your healthcare provider before you use these products  · Ask your healthcare provider if you need vaccines  Infections such as pneumonia, influenza, and hepatitis can be more harmful or more likely to occur in a person who has CKD  Vaccines reduce your risk of infection with these viruses  Follow up with your healthcare provider as directed:  Write down your questions so you remember to ask them during your visits  © 2017 [employer] Information is for End User's use only and may not be sold, redistributed or otherwise used for commercial purposes  All illustrations and images included in CareNotes® are the copyrighted property of [employer]  or [employer]  The above information is an  only  It is not intended as medical advice for individual conditions or treatments  Talk to your doctor, nurse or pharmacist before following any medical regimen to see if it is safe and effective for you

## 2019-10-17 NOTE — ASSESSMENT & PLAN NOTE
Not very well control  He claims it is because of stress  He monitor blood pressure at home and according to him it is much better controlled  Advised  continue to monitor blood pressure and bring the machine next time in the office    I will restart losartan once kidney function gets stabilized

## 2019-10-18 ENCOUNTER — DOCUMENTATION (OUTPATIENT)
Dept: NEPHROLOGY | Facility: CLINIC | Age: 54
End: 2019-10-18

## 2019-10-18 DIAGNOSIS — N18.30 CKD (CHRONIC KIDNEY DISEASE) STAGE 3, GFR 30-59 ML/MIN (HCC): ICD-10-CM

## 2019-10-18 LAB
ALBUMIN SNV-MCNC: 4.2 G/DL
ALPHA1 GLOB SERPL ELPH-MCNC: 0.2 %
ALPHA2 GLOB SERPL ELPH-MCNC: 1.1 %
ANTI-NUCLEAR ANTIBODY (ANA) (HISTORICAL): NORMAL
B-GLOBULIN SERPL ELPH-MCNC: 0.9 G/DL
CHLORIDE UR-SCNC: 17 MMOL/L
CREAT ?TM UR-SCNC: 578 UMOL/L
EXT BLOOD, UA: NEGATIVE
EXT DIFF-ABS BASOPHILS: 0.1
EXT DIFF-ABS EOSINOPHILS: 0.2
EXT DIFF-ABS LYMPHOCYTES: 3
EXT DIFF-ABS MONOCYTES: 1
EXT DIFF-ABS NEUTROPHILS: 7.4
EXT GLUCOSE BLD: 105
EXT GLUCOSE, UA: NEGATIVE
EXT KETONES: NEGATIVE
EXT NITRITE, UA: NEGATIVE
EXT PH, UA: 5
EXT PROTEIN URINE: 583.7
EXT PROTEIN, UA: >500
EXT SPECIFIC GRAVITY, UA: 1.02
EXTERNAL ANION GAP: 9
EXTERNAL BACTERIA (UA): NORMAL
EXTERNAL BUN: 29
EXTERNAL C3: 161
EXTERNAL C4: 35
EXTERNAL CALCIUM: 9.2
EXTERNAL CHLORIDE: 107
EXTERNAL CO2: 23
EXTERNAL CREATININE: 2.71
EXTERNAL EGFR: 25
EXTERNAL HEMATOCRIT: 44 %
EXTERNAL HEMOGLOBIN: 14.2 G/DL
EXTERNAL MCV: 95
EXTERNAL PLATELET COUNT: 308 K/ΜL
EXTERNAL POTASSIUM: 4.1
EXTERNAL RBC (UA): NORMAL
EXTERNAL RBC: 4.63
EXTERNAL RDW: 14.7
EXTERNAL SODIUM: 139
EXTERNAL URIC ACID: 9.8
EXTERNAL WBC (UA): NORMAL
EXTERNAL WBC: 11.8
GAMMA GLOB SERPL ELPH-MCNC: 1.1 %
IGG/ALB SER: 1.3 {RATIO} (ref 1.1–1.8)
OSMOLALITY UR: 500 MMOL/KG (ref 250–900)
PROT SERPL-MCNC: 7.5 G/DL (ref 6.4–8.2)
PROT/CREAT UR: 1.01 MG/G{CREAT}
SODIUM 24H UR-SCNC: 11 MOL/L
WBC # BLD EST: 25 10*3/UL

## 2019-12-31 ENCOUNTER — OFFICE VISIT (OUTPATIENT)
Dept: NEPHROLOGY | Facility: CLINIC | Age: 54
End: 2019-12-31
Payer: COMMERCIAL

## 2019-12-31 VITALS — HEIGHT: 75 IN | WEIGHT: 315 LBS | BODY MASS INDEX: 39.17 KG/M2 | TEMPERATURE: 97.2 F | RESPIRATION RATE: 18 BRPM

## 2019-12-31 DIAGNOSIS — M89.9 CHRONIC KIDNEY DISEASE-MINERAL AND BONE DISORDER: ICD-10-CM

## 2019-12-31 DIAGNOSIS — N18.9 CHRONIC KIDNEY DISEASE-MINERAL AND BONE DISORDER: ICD-10-CM

## 2019-12-31 DIAGNOSIS — M10.49 OTHER SECONDARY GOUT, MULTIPLE SITES, UNSPECIFIED CHRONICITY: ICD-10-CM

## 2019-12-31 DIAGNOSIS — E83.9 CHRONIC KIDNEY DISEASE-MINERAL AND BONE DISORDER: ICD-10-CM

## 2019-12-31 DIAGNOSIS — N18.4 CKD (CHRONIC KIDNEY DISEASE) STAGE 4, GFR 15-29 ML/MIN (HCC): Primary | ICD-10-CM

## 2019-12-31 DIAGNOSIS — E66.01 CLASS 3 SEVERE OBESITY DUE TO EXCESS CALORIES WITHOUT SERIOUS COMORBIDITY WITH BODY MASS INDEX (BMI) OF 40.0 TO 44.9 IN ADULT (HCC): ICD-10-CM

## 2019-12-31 DIAGNOSIS — I10 ESSENTIAL HYPERTENSION: ICD-10-CM

## 2019-12-31 PROCEDURE — 99214 OFFICE O/P EST MOD 30 MIN: CPT | Performed by: INTERNAL MEDICINE

## 2019-12-31 RX ORDER — CALCITRIOL 0.25 UG/1
0.25 CAPSULE, LIQUID FILLED ORAL DAILY
Qty: 30 CAPSULE | Refills: 4 | Status: SHIPPED | OUTPATIENT
Start: 2019-12-31

## 2019-12-31 RX ORDER — HYDROCHLOROTHIAZIDE 12.5 MG/1
12.5 CAPSULE, GELATIN COATED ORAL DAILY
COMMUNITY
Start: 2019-11-15

## 2019-12-31 RX ORDER — ALLOPURINOL 100 MG/1
TABLET ORAL
COMMUNITY
Start: 2019-12-06

## 2019-12-31 NOTE — ASSESSMENT & PLAN NOTE
Patient was started on allopurinol low-dose and was also given prednisone as well as colchicine    Being monitored by rheumatologist

## 2019-12-31 NOTE — ASSESSMENT & PLAN NOTE
Seems to be reasonably well control    I think he does have some white coat component so I will continue same medication

## 2019-12-31 NOTE — PATIENT INSTRUCTIONS
Chronic Kidney Disease   AMBULATORY CARE:   Chronic kidney disease (CKD)  is the gradual and permanent loss of kidney function  Normally, the kidneys remove fluid, chemicals, and waste from your blood  These wastes are turned into urine by your kidneys  CKD may worsen over time and lead to kidney failure  Common symptoms include the following:   · Changes in how often you need to urinate    · Swelling in your arms, legs, or feet    · Shortness of breath    · Fatigue or weakness    · Bad or bitter taste in your mouth    · Nausea, vomiting, or loss of appetite  Seek care immediately if:   · You are confused and very drowsy  · You have a seizure  · You have shortness of breath  Contact your healthcare provider if:   · You suddenly gain or lose more weight than your healthcare provider has told you is okay  · You have itchy skin or a rash  · You urinate more or less than you normally do  · You have blood in your urine  · You have nausea and repeated vomiting  · You have fatigue or muscle weakness  · You have hiccups that will not stop  · You have questions or concerns about your condition or care  Treatment for CKD:  Medicines may be given to decrease blood pressure and get rid of extra fluid  You may also receive medicine to manage health conditions that may occur with CKD  Dialysis is a treatment to remove chemicals and waste from your blood when your kidneys can no longer do this  Surgery may be needed to create an arteriovenous fistula (AVF) in your arm or insert a catheter into your abdomen so that you can receive dialysis  A kidney transplant may be done if your CKD becomes severe  Manage CKD:   · Maintain a healthy weight  Ask your healthcare provider how much you should weigh  Ask him to help you create a weight loss plan if you are overweight  · Exercise 30 to 60 minutes a day, 4 to 7 times a week, or as directed  Ask about the best exercise plan for you   Regular exercise can help you manage CKD, high blood pressure, and diabetes  · Follow your healthcare provider's advice about what to eat and drink  He may tell you to eat food low in sodium (salt), potassium, phosphorus, or protein  You may need to see a dietitian if you need help planning meals  Ask how much liquid to drink each day and which liquids are best for you  · Limit alcohol  Ask how much alcohol is safe for you to drink  A drink of alcohol is 12 ounces of beer, 5 ounces of wine, or 1½ ounces of liquor  · Do not smoke  Nicotine and other chemicals in cigarettes and cigars can cause lung and kidney damage  Ask your healthcare provider for information if you currently smoke and need help to quit  E-cigarettes or smokeless tobacco still contain nicotine  Talk to your healthcare provider before you use these products  · Ask your healthcare provider if you need vaccines  Infections such as pneumonia, influenza, and hepatitis can be more harmful or more likely to occur in a person who has CKD  Vaccines reduce your risk of infection with these viruses  Follow up with your healthcare provider as directed:  Write down your questions so you remember to ask them during your visits  © 2017 [employer] Information is for End User's use only and may not be sold, redistributed or otherwise used for commercial purposes  All illustrations and images included in CareNotes® are the copyrighted property of [employer]  [employer]  The above information is an  only  It is not intended as medical advice for individual conditions or treatments  Talk to your doctor, nurse or pharmacist before following any medical regimen to see if it is safe and effective for you

## 2019-12-31 NOTE — ASSESSMENT & PLAN NOTE
GFR is about 27  Slowly improving  Asymptomatic and progressive nature of kidney disease discussed with him    Advised continues hydration and lose weight and avoid any nephrotoxic medicine

## 2019-12-31 NOTE — PROGRESS NOTES
NEPHROLOGY OFFICE FOLLOW UP  Noreen Hartley 62 y o  male MRN: [id number]    Encounter: [id number] 12/31/2019    REASON FOR VISIT: Noreen Hartley is a 62 y o  male who is here on 12/31/2019 for Chronic Kidney Disease and Follow-up    HPI:    Jun Robertson came in today for follow-up of stage IV CKD  He is doing well  Losing weight slowly  Denies any acute complaint    No chest pain no palpitation or shortness of Breath  Still appear anxious      REVIEW OF SYSTEMS:    Review of Systems   Constitutional: Negative for activity change and fatigue  HENT: Negative for congestion and ear discharge  Eyes: Negative for photophobia and pain  Respiratory: Negative for apnea and choking  Cardiovascular: Negative for chest pain and palpitations  Gastrointestinal: Negative for abdominal distention and blood in stool  Endocrine: Negative for heat intolerance and polyphagia  Genitourinary: Negative for flank pain and urgency  Musculoskeletal: Negative for neck pain and neck stiffness  Skin: Negative for color change and wound  Allergic/Immunologic: Negative for food allergies and immunocompromised state  Neurological: Negative for seizures and facial asymmetry  Hematological: Negative for adenopathy  Does not bruise/bleed easily  Psychiatric/Behavioral: Negative for self-injury and suicidal ideas  PAST MEDICAL HISTORY:  Past Medical History:   Diagnosis Date    Chronic kidney disease     Gout     Hyperlipidemia     Hypertension        PAST SURGICAL HISTORY:  History reviewed  No pertinent surgical history      SOCIAL HISTORY:  Social History     Substance and Sexual Activity   Alcohol Use Yes    Comment: occasional      Social History     Substance and Sexual Activity   Drug Use No     Social History     Tobacco Use   Smoking Status Never Smoker   Smokeless Tobacco Never Used       FAMILY HISTORY:  Family History   Problem Relation Age of Onset    Diabetes Mother MEDICATIONS:    Current Outpatient Medications:     allopurinol (ZYLOPRIM) 100 mg tablet, Start allopurinol 100mg daily x 3 weeks then increase to 200mg daily, Disp: , Rfl:     calcipotriene-betamethasone (TACLONEX) ointment, Apply topically daily To elbows to cure, Disp: 60 g, Rfl: 1    Cholecalciferol (VITAMIN D3) 35173 units CAPS, Take 1 capsule by mouth once a week, Disp: , Rfl: 0    colchicine (COLCRYS) 0 6 mg tablet, Take 0 6 mg by mouth daily , Disp: , Rfl: 1    diclofenac sodium (VOLTAREN) 1 %, Apply 2grams to the affected joint up to 4x daily, Disp: , Rfl:     hydrochlorothiazide (MICROZIDE) 12 5 mg capsule, Take 12 5 mg by mouth daily, Disp: , Rfl:     metoprolol succinate (TOPROL-XL) 100 mg 24 hr tablet, Take 100 mg by mouth daily, Disp: , Rfl: 3    NIFEdipine (ADALAT CC) 90 mg 24 hr tablet, Take 90 mg by mouth daily, Disp: , Rfl: 2    omeprazole (PriLOSEC) 40 MG capsule, Take 40 mg by mouth daily, Disp: , Rfl: 0    predniSONE 20 mg tablet, TAKE 2 TABLET(S) EVERY DAY BY ORAL ROUTE FOR 4 DAYS , Disp: , Rfl: 1    rosuvastatin (CRESTOR) 5 mg tablet, Take 10 mg by mouth daily , Disp: , Rfl: 3    calcitriol (ROCALTROL) 0 25 mcg capsule, Take 1 capsule (0 25 mcg total) by mouth daily, Disp: 30 capsule, Rfl: 4    PHYSICAL EXAM:  Vitals:    12/31/19 1136   Resp: 18   Temp: (!) 97 2 °F (36 2 °C)   TempSrc: Tympanic   Weight: (!) 145 kg (319 lb 6 4 oz)   Height: 6' 3" (1 905 m)     Body mass index is 39 92 kg/m²  Physical Exam   Constitutional: He is oriented to person, place, and time  He appears well-developed  No distress  HENT:   Head: Normocephalic  Mouth/Throat: Oropharynx is clear and moist    Eyes: Conjunctivae are normal  No scleral icterus  Neck: Neck supple  No JVD present  Cardiovascular: Normal rate and normal heart sounds  Pulmonary/Chest: Effort normal  He has no wheezes  Abdominal: Soft  There is no tenderness  Musculoskeletal: Normal range of motion   He exhibits no edema  Neurological: He is alert and oriented to person, place, and time  Skin: Skin is warm  No rash noted  Psychiatric: He has a normal mood and affect  His behavior is normal        LAB RESULTS:  Results for orders placed or performed in visit on 10/18/19   C3 complement   Result Value Ref Range    EXTERNAL C3, Complement 161    C4 complement   Result Value Ref Range    EXTERNAL C4, COMPLEMENT 35 0    Protein / creatinine ratio, urine   Result Value Ref Range    PROTEIN  7     EXT Creatinine Urine 578 0     EXTERNAL Ur Prot/Creat Ratio 1 01    Chloride, urine, random   Result Value Ref Range    Chloride, Ur 17 mmol/L   Sodium, urine, random   Result Value Ref Range    Sodium, Ur 11    Uric acid   Result Value Ref Range    EXTERNAL URIC ACID 9 8    Urinalysis with microscopic   Result Value Ref Range    Spec Grav, UA (Ref:1 003-1 030) 1 022     Glucose, UA (Ref: Negative) negative     Ketones, UA (Ref: Negative) negative     Blood, UA negative     Nitrite, UA (Ref: Negative) negative      Leukocytes, UA (Ref: Negative) 25     pH, UA (Ref: 4 5-8 0) 5 0     Protein, UA (Ref: Negative) >500     RBC, UA 3-5     EXTERNAL WBC, UA 0-2     EXTERNAL BACTERIA, UA few    Osmolality-If this is regarding a toxic alcohol, STOP  Test is not routinely indicated  Please consult medical  on call for further guidance     Result Value Ref Range    Osmolality, Ur 500 250 - 900 mmol/KG   CBC and differential   Result Value Ref Range    WBC 11 8     External RBC 4 63     External Hemoglobin 14 2 g/dL    Hematocrit 44 %    MCV 95     External RDW 14 7     External Platelets 813 K/µL    Abs Neutrophils 7 4     Abs Lymphocytes 3 0     External Abs Monocytes  1 0     External Abs Eosinophils 0 2     External Abs Basophils  0 1    SUKHI,SCREEN (HISTORICAL)   Result Value Ref Range    ANTI-NUCLEAR ANTIBODY (SUKHI) absent    Protein electrophoresis, serum   Result Value Ref Range    Total Protein 7 50 6 40 - 8 20 g/dL Albumin 4 2     Alpha 1 0 2 %    Alpha 2 1 1 %    Beta 0 9     Gamma Globulin 1 1 %    A/G Ratio 1 30 1 10 - 1 80       ASSESSMENT and PLAN:      CKD (chronic kidney disease) stage 4, GFR 15-29 ml/min ([facility])  GFR is about 27  Slowly improving  Asymptomatic and progressive nature of kidney disease discussed with him  Advised continues hydration and lose weight and avoid any nephrotoxic medicine    Chronic kidney disease-mineral and bone disorder  PTH level is increasing at 300  Will start him on calcitriol as part of the management    Hypertension  Seems to be reasonably well control  I think he does have some white coat component so I will continue same medication    Other secondary gout, multiple sites  Patient was started on allopurinol low-dose and was also given prednisone as well as colchicine  Being monitored by rheumatologist    Class 3 severe obesity due to excess calories without serious comorbidity with body mass index (BMI) of 40 0 to 44 9 in [facility])  He need to reduce weight and is trying        Everything discussed at length with him  I will see him back in 3 months and will get blood and urine test before that visit      Portions of the record may have been created with voice recognition software  Occasional wrong word or "sound a like" substitutions may have occurred due to the inherent limitations of voice recognition software  Read the chart carefully and recognize, using context, where substitutions have occurred  If you have any questions, please contact the dictating provider

## 2020-01-16 NOTE — TELEPHONE ENCOUNTER
Called and left a message on machine for patient confirming his appointment for Monday 9/9/2019 nephrology follow up appointment with Dr Lisa Cagle  I also reminded the patient that he needs to have blood work done for this appointment   Nolvia Alvarez,  t
not applicable/given to family/with patient